# Patient Record
Sex: FEMALE | Race: WHITE | Employment: UNEMPLOYED | URBAN - METROPOLITAN AREA
[De-identification: names, ages, dates, MRNs, and addresses within clinical notes are randomized per-mention and may not be internally consistent; named-entity substitution may affect disease eponyms.]

---

## 2019-04-12 ENCOUNTER — APPOINTMENT (OUTPATIENT)
Dept: NON INVASIVE DIAGNOSTICS | Age: 44
End: 2019-04-12
Attending: PHYSICIAN ASSISTANT
Payer: COMMERCIAL

## 2019-04-12 ENCOUNTER — HOSPITAL ENCOUNTER (OUTPATIENT)
Age: 44
Setting detail: OBSERVATION
Discharge: HOME | End: 2019-04-13
Attending: INTERNAL MEDICINE | Admitting: INTERNAL MEDICINE
Payer: COMMERCIAL

## 2019-04-12 DIAGNOSIS — G47.09 OTHER INSOMNIA: Primary | ICD-10-CM

## 2019-04-12 PROBLEM — R00.0 TACHYCARDIA: Status: ACTIVE | Noted: 2019-04-12

## 2019-04-12 LAB
ANION GAP SERPL CALC-SCNC: 6 MMOL/L (ref 5–15)
BUN SERPL-MCNC: 12 MG/DL (ref 6–20)
BUN/CREAT SERPL: 13 (ref 12–20)
CALCIUM SERPL-MCNC: 9.8 MG/DL (ref 8.5–10.1)
CHLORIDE SERPL-SCNC: 105 MMOL/L (ref 97–108)
CO2 SERPL-SCNC: 28 MMOL/L (ref 21–32)
CREAT SERPL-MCNC: 0.93 MG/DL (ref 0.55–1.02)
ERYTHROCYTE [DISTWIDTH] IN BLOOD BY AUTOMATED COUNT: 12.4 % (ref 11.5–14.5)
GLUCOSE SERPL-MCNC: 107 MG/DL (ref 65–100)
HCT VFR BLD AUTO: 45.4 % (ref 35–47)
HGB BLD-MCNC: 15 G/DL (ref 11.5–16)
MCH RBC QN AUTO: 33.2 PG (ref 26–34)
MCHC RBC AUTO-ENTMCNC: 33 G/DL (ref 30–36.5)
MCV RBC AUTO: 100.4 FL (ref 80–99)
NRBC # BLD: 0 K/UL (ref 0–0.01)
NRBC BLD-RTO: 0 PER 100 WBC
PLATELET # BLD AUTO: 302 K/UL (ref 150–400)
PMV BLD AUTO: 11.4 FL (ref 8.9–12.9)
POTASSIUM SERPL-SCNC: 3.7 MMOL/L (ref 3.5–5.1)
RBC # BLD AUTO: 4.52 M/UL (ref 3.8–5.2)
SODIUM SERPL-SCNC: 139 MMOL/L (ref 136–145)
T4 FREE SERPL-MCNC: 1.2 NG/DL (ref 0.8–1.5)
TSH SERPL DL<=0.05 MIU/L-ACNC: 1.72 UIU/ML (ref 0.36–3.74)
WBC # BLD AUTO: 8.5 K/UL (ref 3.6–11)

## 2019-04-12 PROCEDURE — 99218 HC RM OBSERVATION: CPT

## 2019-04-12 PROCEDURE — 85027 COMPLETE CBC AUTOMATED: CPT

## 2019-04-12 PROCEDURE — 80048 BASIC METABOLIC PNL TOTAL CA: CPT

## 2019-04-12 PROCEDURE — 96372 THER/PROPH/DIAG INJ SC/IM: CPT

## 2019-04-12 PROCEDURE — 74011250636 HC RX REV CODE- 250/636: Performed by: INTERNAL MEDICINE

## 2019-04-12 PROCEDURE — 74011250637 HC RX REV CODE- 250/637: Performed by: INTERNAL MEDICINE

## 2019-04-12 PROCEDURE — 36415 COLL VENOUS BLD VENIPUNCTURE: CPT

## 2019-04-12 PROCEDURE — 84439 ASSAY OF FREE THYROXINE: CPT

## 2019-04-12 PROCEDURE — 93306 TTE W/DOPPLER COMPLETE: CPT

## 2019-04-12 PROCEDURE — 74011250637 HC RX REV CODE- 250/637: Performed by: PHYSICIAN ASSISTANT

## 2019-04-12 PROCEDURE — 84443 ASSAY THYROID STIM HORMONE: CPT

## 2019-04-12 RX ORDER — SODIUM CHLORIDE 0.9 % (FLUSH) 0.9 %
5-40 SYRINGE (ML) INJECTION EVERY 8 HOURS
Status: DISCONTINUED | OUTPATIENT
Start: 2019-04-12 | End: 2019-04-13 | Stop reason: HOSPADM

## 2019-04-12 RX ORDER — ZOLPIDEM TARTRATE 5 MG/1
5 TABLET ORAL
Status: DISCONTINUED | OUTPATIENT
Start: 2019-04-12 | End: 2019-04-13 | Stop reason: HOSPADM

## 2019-04-12 RX ORDER — ACETAMINOPHEN 325 MG/1
650 TABLET ORAL
Status: DISCONTINUED | OUTPATIENT
Start: 2019-04-12 | End: 2019-04-13 | Stop reason: HOSPADM

## 2019-04-12 RX ORDER — METOPROLOL TARTRATE 25 MG/1
25 TABLET, FILM COATED ORAL EVERY 12 HOURS
Status: DISCONTINUED | OUTPATIENT
Start: 2019-04-12 | End: 2019-04-13

## 2019-04-12 RX ORDER — SODIUM CHLORIDE 0.9 % (FLUSH) 0.9 %
5-40 SYRINGE (ML) INJECTION AS NEEDED
Status: DISCONTINUED | OUTPATIENT
Start: 2019-04-12 | End: 2019-04-13 | Stop reason: HOSPADM

## 2019-04-12 RX ORDER — ENOXAPARIN SODIUM 100 MG/ML
40 INJECTION SUBCUTANEOUS EVERY 24 HOURS
Status: DISCONTINUED | OUTPATIENT
Start: 2019-04-12 | End: 2019-04-13

## 2019-04-12 RX ORDER — METOPROLOL TARTRATE 5 MG/5ML
5 INJECTION INTRAVENOUS
Status: DISCONTINUED | OUTPATIENT
Start: 2019-04-12 | End: 2019-04-13 | Stop reason: HOSPADM

## 2019-04-12 RX ADMIN — ZOLPIDEM TARTRATE 5 MG: 5 TABLET ORAL at 21:48

## 2019-04-12 RX ADMIN — ENOXAPARIN SODIUM 40 MG: 100 INJECTION SUBCUTANEOUS at 17:17

## 2019-04-12 RX ADMIN — METOPROLOL TARTRATE 25 MG: 25 TABLET ORAL at 15:49

## 2019-04-12 RX ADMIN — Medication 10 ML: at 15:01

## 2019-04-12 RX ADMIN — Medication 10 ML: at 21:48

## 2019-04-12 NOTE — CONSULTS
Cardiology Consult Note      Patient Name: Mary Chung  : 1975 MRN: [id number]  Date: 2019  Time: 2:34 PM  Admit Diagnosis: Tachycardia [R00.0]  Primary Cardiologist: none   Consulting Cardiologist: Rolando Will. Glenis Martinez M.D.  Nidia Osbaldo for Consult: HTN, Tachycardia  Requesting MD: Tila Hammer MD  Assessment/Plan/Discussion:Cardiology Attending:     Patient seen on the day of progress note and examined  and agree with Advance Practice Provider (MANOJ, NP,PA)  assessment and plans. Mary Chung is a 38 y.o. female   Previously quite healthy active lady with gestational DM  Now with fatigue and a rapid sinus tachycardia and marked hypertension  No   No edema rales  Has no chest pain  TSH is 1.7   Prelim echo is WNL-will review later tonight  HR has come down in evening   Unclear source of tachycardia  Has noted numbness in hands  Does not appear dehydrated, no fever- no factors to explain tachycardia  Has been started no metoprolol  Will consider ACE    Patient Vitals for the past 12 hrs:   Temp Pulse Resp BP SpO2   19 1925 98.4 °F (36.9 °C) 83 16 166/84 100 %   19 1653    144/84    19 1549  (!) 105      19 1511 98 °F (36.7 °C) 85 16 144/84 100 %   19 1441  (!) 126      19 1434 98 °F (36.7 °C) (!) 113 16 164/84 100 %      Valentino Officer, MD          HPI:  Mary Chung is a 38 y.o. female admitted on 2019  for Tachycardia [R00.0]. has a past medical history of Gestational diabetes and Hypercholesteremia. Presented to Dr. Emilie Mejia office by referral from her OB/GYN for HTN. Patient notes she has never had higher BP before. She has been experiencing head pressure, numb hands, some visual changes, +/- fatigue and dry eyes as of late. She \"just feels off\" lately. She exercises 3x per week, eats fairly well and has no past medical history, save for gestational DM.   She does not smoke, consumes ETOH socially. FMhx for cancer, but no HTN, DM or CAD    Subjective:  Denies CP, SOB or palpitations at this time. Telemetry with sinus tach. Assessment and Plan     1. HTN   -    - Lopressor 25 mg BID PO   - Check echo  2. Sinus tach   - HR variable from low 100s to 125-130s   - Check echo    Direct admit from Dr. Mila Ruiz office. High BP and HR rate. Echo ordered. Dr. Osborne Monday ordered BMP, CBC and thyroid panel. Will follow up echo results. Patient Active Problem List   Diagnosis Code    Hypercholesteremia E78.00    Gestational diabetes O24.419    Tachycardia R00.0     No specialty comments available. Review of Systems:     GENERAL   Recent weight loss - no   Fever -----------------   no   Chills -----------------   no     EYES, VISION   Visual Changes - no     EARS, NOSE, THROAT   Hearing loss ----------- no   Swallowing difficulties - no     CARDIOVASCULAR   Chest pain/pressure ---- no   Arrhythmia/palpitations - no       RESPIRATORY   Cough ------------------ no   Shortness of breath - no   Wheezing -------------- no   GASTROINTESTINAL   Abdominal pain - no   Heartburn -------- no   Bloody stool ----- no     GENITOURINARY   Frequent urination - no   Urgency -------------- no     MUSCULOSKELETAL   Joint pain/swelling ---- no   Musculoskeletal pain - no     SKIN & INTEGUMENTARY   Rashes - no   Sores --- no         NEUROLOGICAL   Numbness/tingling - no   Sensation loss ------ no     PSYCHIATRIC   Nervousness/anxiety - no   Depression -------------- no     ENDOCRINE   Heat/cold intolerance - no   Excessive thirst -------- no     HEMATOLOGIC/LYMPHATIC   Abnormal bleeding - no     ALL/IMMUN   Allergic reaction ------ no   Recurrent infections - no     Previous treatment/evaluation includes none . Cardiac risk factors: none.     Past Medical History:   Diagnosis Date    Gestational diabetes     Hypercholesteremia      Past Surgical History:   Procedure Laterality Date    HX  SECTION      x 3     Current Facility-Administered Medications   Medication Dose Route Frequency    sodium chloride (NS) flush 5-40 mL  5-40 mL IntraVENous Q8H    sodium chloride (NS) flush 5-40 mL  5-40 mL IntraVENous PRN    zolpidem (AMBIEN) tablet 5 mg  5 mg Oral QHS PRN    acetaminophen (TYLENOL) tablet 650 mg  650 mg Oral Q4H PRN    enoxaparin (LOVENOX) injection 40 mg  40 mg SubCUTAneous Q24H    metoprolol (LOPRESSOR) injection 5 mg  5 mg IntraVENous Q6H PRN       No Known Allergies   Family History   Problem Relation Age of Onset    Breast Cancer Mother 77    Cancer Paternal Grandmother     Cancer Paternal Grandfather     Cancer Maternal Uncle         pancreatic    Breast Cancer Maternal Grandmother 72      Social History     Socioeconomic History    Marital status:      Spouse name: Not on file    Number of children: Not on file    Years of education: Not on file    Highest education level: Not on file   Occupational History    Occupation: stay at home mom   Tobacco Use    Smoking status: Never Smoker    Smokeless tobacco: Never Used   Substance and Sexual Activity    Alcohol use: Yes     Alcohol/week: 1.5 oz     Types: 3 drink(s) per week     Comment: maybe 3 drinks       Objective:    Physical Exam    Vitals: There were no vitals filed for this visit. General:    Alert, cooperative, no distress, appears stated age. Neck:   Supple, no carotid bruit and no JVD. Back:     Symmetric, normal curvature. Lungs:     clear to auscultation bilaterally. Heart[de-identified]    Regular rate and rhythm, S1, S2 normal, no murmur, click, rub or gallop. Abdomen:     Soft, non-tender. Bowel sounds normal.    Extremities:   Extremities normal, atraumatic, no cyanosis or edema. Vascular:   Pulses - 2+ radials   Skin:   Skin color normal. No rashes or lesions   Neurologic:   CN II-XII grossly intact.         Telemetry: normal sinus rhythm, tachy    ECG:   EKG Results     None            Data Review:     Radiology:   XR Results (most recent):  No results found for this or any previous visit. No results for input(s): CPK, TROIQ in the last 72 hours. No lab exists for component: CKQMB, CPKMB, BMPP  No results for input(s): NA, K, CL, CO2, BUN, CREA, GLU, PHOS, CA in the last 72 hours. No results for input(s): WBC, HGB, HCT, PLT, HGBEXT, HCTEXT, PLTEXT in the last 72 hours. No results for input(s): PTP, INR, GPT, SGOT, AP in the last 72 hours. No lab exists for component: PTTP, INREXT  No results for input(s): CHOL, LDLC in the last 72 hours. No lab exists for component: TGL, HDLC,  HBA1C  No results for input(s): CRP, TSH, TSHEXT in the last 72 hours. No lab exists for component: ESR    Alesia Dach. Andrew Corbett M.D.          [facility] [address] [place] [address] [address] [facility], [address] [address]     [facility]     [phone]    CC:Solo Watkins MD

## 2019-04-12 NOTE — PROGRESS NOTES
Problem: Hypertension Goal: *Blood pressure within specified parameters Outcome: Progressing Towards Goal 
 Cardiology consulted, [place] ROBERTS at bedside, orders received. Will continue to monitor and educate. Bedside shift change report given to Mike Mei (oncoming nurse) by Abdiaziz Dowd (offgoing nurse). Report included the following information SBAR, Kardex, ED Summary, Procedure Summary, Intake/Output, MAR, Recent Results, Med Rec Status, Cardiac Rhythm NSR, Alarm Parameters  and Quality Measures.

## 2019-04-12 NOTE — H&P
History and Physical 
 
Subjective:  
 
Nolvia Orosco is a 38 y.o.  female who presents with tachycardia and hypertensive urgency. Patient has been feeling more anxious since the Fall. Recently felt \"off\"/  Saw OB/gyn last week who told her she had elevated bp, to stop the bcp and monitor bp. She presents to office today- bp 180/100 and pulse up to 160. She denies chest pain, sob, nausea, vomiting, diarrhea, weight loss, syncope, fever, palpitations, headache, visual changes. .  
Past Medical History:  
Diagnosis Date  Gestational diabetes  Hypercholesteremia No Known Allergies Prior to Admission medications Not on File Social History Tobacco Use  Smoking status: Never Smoker  Smokeless tobacco: Never Used Substance Use Topics  Alcohol use: Yes Alcohol/week: 1.5 oz Types: 3 drink(s) per week Comment: maybe 3 drinks Family History Problem Relation Age of Onset  Breast Cancer Mother 77  Cancer Paternal Grandmother  Cancer Paternal Grandfather  Cancer Maternal Uncle   
     pancreatic  Breast Cancer Maternal Grandmother 72 Review of Systems: As above. Objective:  
 
 
Physical Exam: slim 37 yo wf In NAD. HEENT -- Pupils round. O/P Clear. Neck -- Supple. No JVD. no thyromegaly Heart -- tachy at 160 Lungs -- CTA. Abdomen -- Soft. Non-tender. Non-distended. No masses. Bowel sounds present. Extremities -- No edema. Data Review:  
Recent Results (from the past 24 hour(s)) AMB POC URINALYSIS DIP STICK MANUAL W/O MICRO Collection Time: 04/12/19  1:11 PM  
Result Value Ref Range Color (UA POC) Yellow Clarity (UA POC) Clear Glucose (UA POC) Negative Negative Bilirubin (UA POC) Negative Negative Ketones (UA POC) Negative Negative Specific gravity (UA POC) 1.010 1.001 - 1.035 Blood (UA POC) Negative Negative pH (UA POC) 7.0 4.6 - 8.0 Protein (UA POC) Negative Negative Urobilinogen (UA POC) 0.2 mg/dL 0.2 - 1 Nitrites (UA POC) Negative Negative Leukocyte esterase (UA POC) Negative Negative AMB POC COMPLETE CBC,AUTOMATED ENTER Collection Time: 04/12/19  1:11 PM  
Result Value Ref Range WBC (POC) 9.0 4.5 - 10.5 K/uL LYMPHOCYTES (POC) 35.7 20.5 - 51.1 % MONOCYTES (POC) 4.4 1.7 - 9.3 % GRANULOCYTES (POC) 59.9 42.2 - 75.2 % ABS. LYMPHS (POC) 3.2 1.2 - 3.4 K/uL  
 ABS. MONOS (POC) 0.4 0.1 - 0.6 10^3/ul  
 ABS. GRANS (POC) 5.4 1.4 - 6.5 10^3/ul RBC (POC) 4.84 4.00 - 6.00 M/uL  
 HGB (POC) 15.9 11 - 18 g/dL HCT (POC) 47.4 35.0 - 60.0 % MCV (POC) 98.0 80.0 - 99.9 fL  
 MCH (POC) 32.9 (A) 27.0 - 31.0 pg  
 MCHC (POC) 33.6 33.0 - 37.0 g/dL  
 RDW (POC) 12.9 11.6 - 13.7 % PLATELET (POC) 340 783 - 450 K/uL  
 MPV (POC) 8.6 7.8 - 11 fL Chest x-ray EKG Assessment:  
 
Active Problems: 
  Tachycardia (4/12/2019) Plan:  
Admit for observation due to the weekend and elevated heart rate. Monitor to rule out a flutter, iv lopressor, cards consult, echo Check labs to rule out secondary causes. Signed By: Isabella Suarez MD   
 April 12, 2019

## 2019-04-13 VITALS
DIASTOLIC BLOOD PRESSURE: 92 MMHG | OXYGEN SATURATION: 100 % | RESPIRATION RATE: 16 BRPM | TEMPERATURE: 98.6 F | WEIGHT: 116.4 LBS | HEIGHT: 62 IN | SYSTOLIC BLOOD PRESSURE: 145 MMHG | BODY MASS INDEX: 21.42 KG/M2 | HEART RATE: 83 BPM

## 2019-04-13 LAB
ALBUMIN SERPL-MCNC: 3.7 G/DL (ref 3.5–5)
ALBUMIN/GLOB SERPL: 1 {RATIO} (ref 1.1–2.2)
ALP SERPL-CCNC: 66 U/L (ref 45–117)
ALT SERPL-CCNC: 22 U/L (ref 12–78)
ANION GAP SERPL CALC-SCNC: 6 MMOL/L (ref 5–15)
AST SERPL-CCNC: 15 U/L (ref 15–37)
BILIRUB SERPL-MCNC: 0.5 MG/DL (ref 0.2–1)
BUN SERPL-MCNC: 11 MG/DL (ref 6–20)
BUN/CREAT SERPL: 13 (ref 12–20)
CALCIUM SERPL-MCNC: 9.5 MG/DL (ref 8.5–10.1)
CHLORIDE SERPL-SCNC: 106 MMOL/L (ref 97–108)
CO2 SERPL-SCNC: 28 MMOL/L (ref 21–32)
CREAT SERPL-MCNC: 0.87 MG/DL (ref 0.55–1.02)
ECHO AO ROOT DIAM: 2.77 CM
ECHO AV PEAK GRADIENT: 10.3 MMHG
ECHO AV PEAK VELOCITY: 160.33 CM/S
ECHO LA AREA 4C: 12.9 CM2
ECHO LA MAJOR AXIS: 2.59 CM
ECHO LA TO AORTIC ROOT RATIO: 0.93
ECHO LA VOL 2C: 61.33 ML (ref 22–52)
ECHO LA VOL 4C: 27.21 ML (ref 22–52)
ECHO LA VOL BP: 47.39 ML (ref 22–52)
ECHO LA VOL/BSA BIPLANE: 31.25 ML/M2 (ref 16–28)
ECHO LA VOLUME INDEX A2C: 40.44 ML/M2 (ref 16–28)
ECHO LA VOLUME INDEX A4C: 17.94 ML/M2 (ref 16–28)
ECHO LV INTERNAL DIMENSION DIASTOLIC: 4.43 CM (ref 3.9–5.3)
ECHO LV INTERNAL DIMENSION SYSTOLIC: 2.89 CM
ECHO LV IVSD: 0.71 CM (ref 0.6–0.9)
ECHO LV MASS 2D: 91.7 G (ref 67–162)
ECHO LV MASS INDEX 2D: 60.5 G/M2 (ref 43–95)
ECHO LV POSTERIOR WALL DIASTOLIC: 0.59 CM (ref 0.6–0.9)
ECHO MV A VELOCITY: 53.66 CM/S
ECHO MV AREA PHT: 3.8 CM2
ECHO MV E DECELERATION TIME (DT): 198 MS
ECHO MV E VELOCITY: 91.56 CM/S
ECHO MV E/A RATIO: 1.71
ECHO MV PRESSURE HALF TIME (PHT): 57.4 MS
ECHO RV INTERNAL DIMENSION: 2.82 CM
ECHO TV REGURGITANT MAX VELOCITY: 188.05 CM/S
ECHO TV REGURGITANT PEAK GRADIENT: 14.1 MMHG
GLOBULIN SER CALC-MCNC: 3.8 G/DL (ref 2–4)
GLUCOSE SERPL-MCNC: 97 MG/DL (ref 65–100)
POTASSIUM SERPL-SCNC: 3.6 MMOL/L (ref 3.5–5.1)
PROT SERPL-MCNC: 7.5 G/DL (ref 6.4–8.2)
SODIUM SERPL-SCNC: 140 MMOL/L (ref 136–145)

## 2019-04-13 PROCEDURE — 74011250637 HC RX REV CODE- 250/637: Performed by: INTERNAL MEDICINE

## 2019-04-13 PROCEDURE — 80053 COMPREHEN METABOLIC PANEL: CPT

## 2019-04-13 PROCEDURE — 99218 HC RM OBSERVATION: CPT

## 2019-04-13 PROCEDURE — 82384 ASSAY THREE CATECHOLAMINES: CPT

## 2019-04-13 PROCEDURE — 36415 COLL VENOUS BLD VENIPUNCTURE: CPT

## 2019-04-13 RX ORDER — METOPROLOL SUCCINATE 25 MG/1
25 TABLET, EXTENDED RELEASE ORAL DAILY
Status: DISCONTINUED | OUTPATIENT
Start: 2019-04-13 | End: 2019-04-13 | Stop reason: HOSPADM

## 2019-04-13 RX ORDER — ZOLPIDEM TARTRATE 5 MG/1
5 TABLET ORAL
Qty: 10 TAB | Refills: 0 | Status: SHIPPED | OUTPATIENT
Start: 2019-04-13 | End: 2019-07-02

## 2019-04-13 RX ORDER — METOPROLOL SUCCINATE 25 MG/1
25 TABLET, EXTENDED RELEASE ORAL DAILY
Qty: 30 TAB | Refills: 0 | Status: SHIPPED | OUTPATIENT
Start: 2019-04-14 | End: 2019-04-17 | Stop reason: DRUGHIGH

## 2019-04-13 RX ORDER — ACETAMINOPHEN 325 MG/1
650 TABLET ORAL
Qty: 25 TAB | Refills: 0 | Status: SHIPPED
Start: 2019-04-13

## 2019-04-13 RX ADMIN — METOPROLOL SUCCINATE 25 MG: 25 TABLET, EXTENDED RELEASE ORAL at 08:22

## 2019-04-13 RX ADMIN — Medication 10 ML: at 06:05

## 2019-04-13 NOTE — PROGRESS NOTES
Medical Progress Note NAME: Sarika Uriarte :  1975 MRM:  [id number] Date/Time: 2019 Problem List:  
Active Problems: 
  Tachycardia (2019) Subjective:  
 
Feels well w/o anxiety , palpitations, flushing , diarrhea. bp and pulse have come down after 1 dose Lopressor yesterday Past Medical History:  
Diagnosis Date  Gestational diabetes  Hypercholesteremia Objective:  
 
 
 
Vitals:  
  
Last 24hrs VS reviewed since prior progress note. Most recent are: monitor : NSR Visit Vitals /81 (BP 1 Location: Right arm, BP Patient Position: At rest) Pulse 80 Temp 98.3 °F (36.8 °C) Resp 17 Ht 5' 2\" (1.575 m) Wt 116 lb 6.5 oz (52.8 kg) SpO2 100% BMI 21.29 kg/m² SpO2 Readings from Last 6 Encounters:  
19 100% Intake/Output Summary (Last 24 hours) at 2019 0744 Last data filed at 2019 2232 Gross per 24 hour Intake 480 ml Output  Net 480 ml Exam:  
   General:  Alert, cooperative, no distress, appears stated age. Lungs:   Clear to auscultation bilaterally. Heart:  Regular rate and rhythm, S1, S2 normal, no murmur, click, rub or gallop. Abdomen:   Soft, non-tender. Bowel sounds normal. No masses,  No organomegaly. Extremities: No pedal edema. Lab Data Reviewed: (see below) Recent Results (from the past 24 hour(s)) AMB POC URINALYSIS DIP STICK MANUAL W/O MICRO Collection Time: 19  1:11 PM  
Result Value Ref Range Color (UA POC) Yellow Clarity (UA POC) Clear Glucose (UA POC) Negative Negative Bilirubin (UA POC) Negative Negative Ketones (UA POC) Negative Negative Specific gravity (UA POC) 1.010 1.001 - 1.035 Blood (UA POC) Negative Negative pH (UA POC) 7.0 4.6 - 8.0 Protein (UA POC) Negative Negative Urobilinogen (UA POC) 0.2 mg/dL 0.2 - 1 Nitrites (UA POC) Negative Negative Leukocyte esterase (UA POC) Negative Negative AMB POC COMPLETE CBC,AUTOMATED ENTER Collection Time: 04/12/19  1:11 PM  
Result Value Ref Range WBC (POC) 9.0 4.5 - 10.5 K/uL LYMPHOCYTES (POC) 35.7 20.5 - 51.1 % MONOCYTES (POC) 4.4 1.7 - 9.3 % GRANULOCYTES (POC) 59.9 42.2 - 75.2 % ABS. LYMPHS (POC) 3.2 1.2 - 3.4 K/uL  
 ABS. MONOS (POC) 0.4 0.1 - 0.6 10^3/ul  
 ABS. GRANS (POC) 5.4 1.4 - 6.5 10^3/ul RBC (POC) 4.84 4.00 - 6.00 M/uL  
 HGB (POC) 15.9 11 - 18 g/dL HCT (POC) 47.4 35.0 - 60.0 % MCV (POC) 98.0 80.0 - 99.9 fL  
 MCH (POC) 32.9 (A) 27.0 - 31.0 pg  
 MCHC (POC) 33.6 33.0 - 37.0 g/dL  
 RDW (POC) 12.9 11.6 - 13.7 % PLATELET (POC) 984 447 - 450 K/uL  
 MPV (POC) 8.6 7.8 - 11 fL  
CBC W/O DIFF Collection Time: 04/12/19  3:04 PM  
Result Value Ref Range WBC 8.5 3.6 - 11.0 K/uL  
 RBC 4.52 3.80 - 5.20 M/uL  
 HGB 15.0 11.5 - 16.0 g/dL HCT 45.4 35.0 - 47.0 % .4 (H) 80.0 - 99.0 FL  
 MCH 33.2 26.0 - 34.0 PG  
 MCHC 33.0 30.0 - 36.5 g/dL  
 RDW 12.4 11.5 - 14.5 % PLATELET 856 534 - 714 K/uL MPV 11.4 8.9 - 12.9 FL  
 NRBC 0.0 0  WBC ABSOLUTE NRBC 0.00 0.00 - 0.01 K/uL  
TSH 3RD GENERATION Collection Time: 04/12/19  3:04 PM  
Result Value Ref Range TSH 1.72 0.36 - 3.74 uIU/mL T4, FREE Collection Time: 04/12/19  3:04 PM  
Result Value Ref Range T4, Free 1.2 0.8 - 1.5 NG/DL  
METABOLIC PANEL, BASIC Collection Time: 04/12/19  3:04 PM  
Result Value Ref Range Sodium 139 136 - 145 mmol/L Potassium 3.7 3.5 - 5.1 mmol/L Chloride 105 97 - 108 mmol/L  
 CO2 28 21 - 32 mmol/L Anion gap 6 5 - 15 mmol/L Glucose 107 (H) 65 - 100 mg/dL BUN 12 6 - 20 MG/DL Creatinine 0.93 0.55 - 1.02 MG/DL  
 BUN/Creatinine ratio 13 12 - 20 GFR est AA >60 >60 ml/min/1.73m2 GFR est non-AA >60 >60 ml/min/1.73m2 Calcium 9.8 8.5 - 10.1 MG/DL  
ECHO ADULT COMPLETE Collection Time: 04/12/19  5:14 PM  
Result Value Ref Range LA Volume 47.39 22 - 52 mL Ao Root D 2.77 cm Aortic Valve Systolic Peak Velocity 295.17 cm/s AoV PG 10.3 mmHg LVIDd 4.43 3.9 - 5.3 cm  
 LVPWd 0.59 (A) 0.6 - 0.9 cm LVIDs 2.89 cm IVSd 0.71 0.6 - 0.9 cm  
 MVA (PHT) 3.8 cm2  
 MV A Aramis 53.66 cm/s  
 MV E Aramis 91.56 cm/s  
 MV E/A 1.70 Left Atrium to Aortic Root Ratio 0.93   
 RVIDd 2.82 cm  
 LA Vol 4C 27.21 22 - 52 mL  
 LA Vol 2C 61.33 (A) 22 - 52 mL  
 LA Area 4C 12.9 cm2 LV Mass AL 91.7 67 - 162 g  
 LV Mass AL Index 60.5 43 - 95 g/m2 Mitral Valve E Wave Deceleration Time 198.0 ms  
 Mitral Valve Pressure Half-time 57.4 ms Left Atrium Major Axis 2.59 cm Triscuspid Valve Regurgitation Peak Gradient 14.1 mmHg  
 TR Max Velocity 188.05 cm/s  
 LA Vol Index 31.25 16 - 28 ml/m2 LA Vol Index 40.44 16 - 28 ml/m2 LA Vol Index 17.94 16 - 28 ml/m2 METABOLIC PANEL, COMPREHENSIVE Collection Time: 04/13/19  3:23 AM  
Result Value Ref Range Sodium 140 136 - 145 mmol/L Potassium 3.6 3.5 - 5.1 mmol/L Chloride 106 97 - 108 mmol/L  
 CO2 28 21 - 32 mmol/L Anion gap 6 5 - 15 mmol/L Glucose 97 65 - 100 mg/dL BUN 11 6 - 20 MG/DL Creatinine 0.87 0.55 - 1.02 MG/DL  
 BUN/Creatinine ratio 13 12 - 20 GFR est AA >60 >60 ml/min/1.73m2 GFR est non-AA >60 >60 ml/min/1.73m2 Calcium 9.5 8.5 - 10.1 MG/DL Bilirubin, total 0.5 0.2 - 1.0 MG/DL  
 ALT (SGPT) 22 12 - 78 U/L  
 AST (SGOT) 15 15 - 37 U/L Alk. phosphatase 66 45 - 117 U/L Protein, total 7.5 6.4 - 8.2 g/dL Albumin 3.7 3.5 - 5.0 g/dL Globulin 3.8 2.0 - 4.0 g/dL A-G Ratio 1.0 (L) 1.1 - 2.2 Medications Reviewed: (see below) 
 
______________________________________________________________________ Medications:  
 
Current Facility-Administered Medications Medication Dose Route Frequency  metoprolol succinate (TOPROL-XL) XL tablet 25 mg  25 mg Oral DAILY  sodium chloride (NS) flush 5-40 mL  5-40 mL IntraVENous Q8H  
 sodium chloride (NS) flush 5-40 mL  5-40 mL IntraVENous PRN  
  zolpidem (AMBIEN) tablet 5 mg  5 mg Oral QHS PRN  
 acetaminophen (TYLENOL) tablet 650 mg  650 mg Oral Q4H PRN  
 enoxaparin (LOVENOX) injection 40 mg  40 mg SubCUTAneous Q24H  
 metoprolol (LOPRESSOR) injection 5 mg  5 mg IntraVENous Q6H PRN Assessment:  
Tachycardia , possible familial HTN, responding to BB Change Lopressor to Toprol. Check serum Catecholamines for completeness. Cards f/u Patient Active Problem List  
Diagnosis Code  Hypercholesteremia E78.00  Gestational diabetes O24.419  Tachycardia R00.0 Plan: Hopefully home later today  
      
 
  
 
 
  
              
 
 
 
 
 
 
      
___________________________________________________ Attending Physician: John Mckeon MD

## 2019-04-13 NOTE — PROGRESS NOTES
Cardiology Progress Note 4/13/2019 12:18 PM 
 
Admit Date: 4/12/2019 Admit Diagnosis: Tachycardia [R00.0] Assessment:  
 
Active Problems: 
  Tachycardia (4/12/2019) Plan: Hypertension NOS  improved   Continue current therapy, OK to release. Echo normal, no LVH. Sanya Velez MD 
[phone]  Cell Subjective:  
 
Jose Mejia denies palpitations, irregular heart beats. She reports symptoms are improved since yesterday. ROS: negative except as noted above. Objective:  
 
 
Visit Vitals BP (!) 145/92 (BP 1 Location: Right arm, BP Patient Position: At rest) Pulse 83 Temp 98.6 °F (37 °C) Resp 16 Ht 5' 2\" (1.575 m) Wt 52.8 kg (116 lb 6.5 oz) SpO2 100% BMI 21.29 kg/m² Current Facility-Administered Medications Medication Dose Route Frequency  metoprolol succinate (TOPROL-XL) XL tablet 25 mg  25 mg Oral DAILY  sodium chloride (NS) flush 5-40 mL  5-40 mL IntraVENous Q8H  
 sodium chloride (NS) flush 5-40 mL  5-40 mL IntraVENous PRN  
 zolpidem (AMBIEN) tablet 5 mg  5 mg Oral QHS PRN  
 acetaminophen (TYLENOL) tablet 650 mg  650 mg Oral Q4H PRN  
 metoprolol (LOPRESSOR) injection 5 mg  5 mg IntraVENous Q6H PRN Physical Exam: 
Visit Vitals BP (!) 145/92 (BP 1 Location: Right arm, BP Patient Position: At rest) Pulse 83 Temp 98.6 °F (37 °C) Resp 16 Ht 5' 2\" (1.575 m) Wt 52.8 kg (116 lb 6.5 oz) SpO2 100% BMI 21.29 kg/m² General Appearance:  Well developed, well nourished,alert and oriented x 3,  individual in no acute distress. Ears/Nose/Mouth/Throat:   Hearing grossly normal. 
  
    Neck: Supple. Chest:   Lungs clear to auscultation bilaterally. Cardiovascular:  Regular rate and rhythm, S1, S2 normal, no murmur. Abdomen:   Soft, non-tender, bowel sounds are active. Extremities: No edema bilaterally. Skin: Warm and dry. Telemetry: normal sinus rhythm Data Review:  
 
Labs: Recent Results (from the past 24 hour(s)) AMB POC URINALYSIS DIP STICK MANUAL W/O MICRO Collection Time: 04/12/19  1:11 PM  
Result Value Ref Range Color (UA POC) Yellow Clarity (UA POC) Clear Glucose (UA POC) Negative Negative Bilirubin (UA POC) Negative Negative Ketones (UA POC) Negative Negative Specific gravity (UA POC) 1.010 1.001 - 1.035 Blood (UA POC) Negative Negative pH (UA POC) 7.0 4.6 - 8.0 Protein (UA POC) Negative Negative Urobilinogen (UA POC) 0.2 mg/dL 0.2 - 1 Nitrites (UA POC) Negative Negative Leukocyte esterase (UA POC) Negative Negative AMB POC COMPLETE CBC,AUTOMATED ENTER Collection Time: 04/12/19  1:11 PM  
Result Value Ref Range WBC (POC) 9.0 4.5 - 10.5 K/uL LYMPHOCYTES (POC) 35.7 20.5 - 51.1 % MONOCYTES (POC) 4.4 1.7 - 9.3 % GRANULOCYTES (POC) 59.9 42.2 - 75.2 % ABS. LYMPHS (POC) 3.2 1.2 - 3.4 K/uL  
 ABS. MONOS (POC) 0.4 0.1 - 0.6 10^3/ul  
 ABS. GRANS (POC) 5.4 1.4 - 6.5 10^3/ul RBC (POC) 4.84 4.00 - 6.00 M/uL  
 HGB (POC) 15.9 11 - 18 g/dL HCT (POC) 47.4 35.0 - 60.0 % MCV (POC) 98.0 80.0 - 99.9 fL  
 MCH (POC) 32.9 (A) 27.0 - 31.0 pg  
 MCHC (POC) 33.6 33.0 - 37.0 g/dL  
 RDW (POC) 12.9 11.6 - 13.7 % PLATELET (POC) 356 514 - 450 K/uL  
 MPV (POC) 8.6 7.8 - 11 fL  
CBC W/O DIFF Collection Time: 04/12/19  3:04 PM  
Result Value Ref Range WBC 8.5 3.6 - 11.0 K/uL  
 RBC 4.52 3.80 - 5.20 M/uL  
 HGB 15.0 11.5 - 16.0 g/dL HCT 45.4 35.0 - 47.0 % .4 (H) 80.0 - 99.0 FL  
 MCH 33.2 26.0 - 34.0 PG  
 MCHC 33.0 30.0 - 36.5 g/dL  
 RDW 12.4 11.5 - 14.5 % PLATELET 270 199 - 377 K/uL MPV 11.4 8.9 - 12.9 FL  
 NRBC 0.0 0  WBC ABSOLUTE NRBC 0.00 0.00 - 0.01 K/uL  
TSH 3RD GENERATION Collection Time: 04/12/19  3:04 PM  
Result Value Ref Range TSH 1.72 0.36 - 3.74 uIU/mL T4, FREE Collection Time: 04/12/19  3:04 PM  
Result Value Ref Range  T4, Free 1.2 0.8 - 1.5 NG/DL  
 METABOLIC PANEL, BASIC Collection Time: 04/12/19  3:04 PM  
Result Value Ref Range Sodium 139 136 - 145 mmol/L Potassium 3.7 3.5 - 5.1 mmol/L Chloride 105 97 - 108 mmol/L  
 CO2 28 21 - 32 mmol/L Anion gap 6 5 - 15 mmol/L Glucose 107 (H) 65 - 100 mg/dL BUN 12 6 - 20 MG/DL Creatinine 0.93 0.55 - 1.02 MG/DL  
 BUN/Creatinine ratio 13 12 - 20 GFR est AA >60 >60 ml/min/1.73m2 GFR est non-AA >60 >60 ml/min/1.73m2 Calcium 9.8 8.5 - 10.1 MG/DL  
ECHO ADULT COMPLETE Collection Time: 04/12/19  5:14 PM  
Result Value Ref Range LA Volume 47.39 22 - 52 mL Ao Root D 2.77 cm Aortic Valve Systolic Peak Velocity 536.48 cm/s AoV PG 10.3 mmHg LVIDd 4.43 3.9 - 5.3 cm  
 LVPWd 0.59 (A) 0.6 - 0.9 cm LVIDs 2.89 cm IVSd 0.71 0.6 - 0.9 cm  
 MVA (PHT) 3.8 cm2  
 MV A Aramis 53.66 cm/s  
 MV E Aramis 91.56 cm/s  
 MV E/A 1.71 Left Atrium to Aortic Root Ratio 0.93   
 RVIDd 2.82 cm  
 LA Vol 4C 27.21 22 - 52 mL  
 LA Vol 2C 61.33 (A) 22 - 52 mL  
 LA Area 4C 12.9 cm2 LV Mass AL 91.7 67 - 162 g  
 LV Mass AL Index 60.5 43 - 95 g/m2 Mitral Valve E Wave Deceleration Time 198.0 ms  
 Mitral Valve Pressure Half-time 57.4 ms Left Atrium Major Axis 2.59 cm Triscuspid Valve Regurgitation Peak Gradient 14.1 mmHg  
 TR Max Velocity 188.05 cm/s  
 LA Vol Index 31.25 16 - 28 ml/m2 LA Vol Index 40.44 16 - 28 ml/m2 LA Vol Index 17.94 16 - 28 ml/m2 METABOLIC PANEL, COMPREHENSIVE Collection Time: 04/13/19  3:23 AM  
Result Value Ref Range Sodium 140 136 - 145 mmol/L Potassium 3.6 3.5 - 5.1 mmol/L Chloride 106 97 - 108 mmol/L  
 CO2 28 21 - 32 mmol/L Anion gap 6 5 - 15 mmol/L Glucose 97 65 - 100 mg/dL BUN 11 6 - 20 MG/DL Creatinine 0.87 0.55 - 1.02 MG/DL  
 BUN/Creatinine ratio 13 12 - 20 GFR est AA >60 >60 ml/min/1.73m2 GFR est non-AA >60 >60 ml/min/1.73m2 Calcium 9.5 8.5 - 10.1 MG/DL  Bilirubin, total 0.5 0.2 - 1.0 MG/DL  
 ALT (SGPT) 22 12 - 78 U/L  
 AST (SGOT) 15 15 - 37 U/L Alk. phosphatase 66 45 - 117 U/L Protein, total 7.5 6.4 - 8.2 g/dL Albumin 3.7 3.5 - 5.0 g/dL Globulin 3.8 2.0 - 4.0 g/dL A-G Ratio 1.0 (L) 1.1 - 2.2 Pat Millan MD

## 2019-04-13 NOTE — DISCHARGE INSTRUCTIONS
Patient Discharge Instructions    Dorie Powers / [id number] : 1975    Admitted 2019 Discharged: 2019     Take Home Medications          · It is important that you take the medication exactly as they are prescribed. · Keep your medication in the bottles provided by the pharmacist and keep a list of the medication names, dosages, and times to be taken in your wallet. · Do not take other medications without consulting your doctor. What to do at Home    Recommended diet: low salt. No alcohol until you see Dr. Ziggy Vines. No Advil(Ibuprofen), Aleve, or Pseudofed. Tylenol as needed is fine. Recommended activity: no heavy exercise. You may go for walks but no running for exercise until you see Dr. Luis Wilson. If you experience any of the following symptoms: dizziness, heart racing, headache, or flushing, please follow up with Dr. Luis Wilson at [phone]    Follow-up with Dr. Luis Wilson the week of April 15. On 4/15, call her for an appointment        Information obtained by :  I understand that if any problems occur once I am at home I am to contact my physician. I understand and acknowledge receipt of the instructions indicated above.                                                                                                                                            Physician's or R.N.'s Signature                                                                  Date/Time                                                                                                                                              Patient or Representative Signature                                                          Date/Time

## 2019-04-13 NOTE — PROGRESS NOTES
Pt seen in f/u . bp improved. Discussed her situation with Dr. Sheri Burdick. She appears stable for d/c  
D/c instructions reviewed.

## 2019-04-13 NOTE — PROGRESS NOTES
Problem: Falls - Risk of 
Goal: *Absence of Falls Description Document Nannette Prasad Fall Risk and appropriate interventions in the flowsheet. 4/13/2019 0138 by Emil Seip, RN Outcome: Progressing Towards Goal 
4/12/2019 2335 by Emil Seip, RN Outcome: Progressing Towards Goal 
Up ad dago with steady gait. Started on beta blocker and also taking Hypnotics at night. Instructed to sit up slowly and to call for dizziness. Stated understanding. Problem: Hypertension Goal: *Blood pressure within specified parameters 4/13/2019 0138 by Emil Seip, RN Outcome: Progressing Towards Goal 
4/12/2019 2335 by Emil Seip, RN Outcome: Progressing Towards Goal 
    BP continues elevated. Stated anxiety r/t hospitalization and New diagnosis of hypertension. Beta blocker started. 9900-  Bedside shift change report given to JEANNE Xie (oncoming nurse) by JEANNE Saucedo (offgoing nurse). Report included the following information SBAR, Kardex, Intake/Output, Recent Results and Cardiac Rhythm NSR.

## 2019-04-13 NOTE — PROGRESS NOTES
Problem: Hypertension Goal: *Blood pressure within specified parameters Outcome: Progressing Towards Goal 
 Pt switched to Toprolol XL this am, plans to discharge this afternoon once to is cleared by cardiology. Pt has handout on metoprolol including side effects, pt verbalized understanding. Will continue to monitor and educate. I have reviewed discharge instructions with the patient. The patient verbalized understanding. I went over all prescriptions and follow up appointments. Pt was given opportunity for clarification and questions. Pt electronically signed copy of discharge summary.

## 2019-04-17 LAB
DOPAMINE SERPL-MCNC: 47 PG/ML (ref 0–48)
EPINEPH PLAS-MCNC: 75 PG/ML (ref 0–62)
NOREPINEPH PLAS-MCNC: 392 PG/ML (ref 0–874)

## 2019-05-13 ENCOUNTER — HOSPITAL ENCOUNTER (OUTPATIENT)
Dept: ULTRASOUND IMAGING | Age: 44
Discharge: HOME | End: 2019-05-13
Attending: INTERNAL MEDICINE
Payer: COMMERCIAL

## 2019-05-13 DIAGNOSIS — I10 HYPERTENSION, UNSPECIFIED TYPE: ICD-10-CM

## 2019-05-13 PROCEDURE — 76770 US EXAM ABDO BACK WALL COMP: CPT

## 2019-05-17 NOTE — PROGRESS NOTES
Called patient and Dr. Shahid December to discuss dispariety in renal size.   Will set up duplex and neprology appt

## 2019-06-07 ENCOUNTER — HOSPITAL ENCOUNTER (OUTPATIENT)
Dept: CT IMAGING | Age: 44
Discharge: HOME | End: 2019-06-07
Attending: INTERNAL MEDICINE
Payer: COMMERCIAL

## 2019-06-07 DIAGNOSIS — N27.9 SMALL KIDNEY, UNSPECIFIED: ICD-10-CM

## 2019-06-07 DIAGNOSIS — I70.1 ATHEROSCLEROSIS OF RENAL ARTERY (HCC): ICD-10-CM

## 2019-06-07 PROCEDURE — 74011000258 HC RX REV CODE- 258: Performed by: RADIOLOGY

## 2019-06-07 PROCEDURE — 74177 CT ABD & PELVIS W/CONTRAST: CPT

## 2019-06-07 PROCEDURE — 74011636320 HC RX REV CODE- 636/320: Performed by: RADIOLOGY

## 2019-06-07 RX ORDER — SODIUM CHLORIDE 0.9 % (FLUSH) 0.9 %
10 SYRINGE (ML) INJECTION
Status: COMPLETED | OUTPATIENT
Start: 2019-06-07 | End: 2019-06-07

## 2019-06-07 RX ADMIN — Medication 10 ML: at 15:26

## 2019-06-07 RX ADMIN — SODIUM CHLORIDE 100 ML: 900 INJECTION, SOLUTION INTRAVENOUS at 15:26

## 2019-06-07 RX ADMIN — IOPAMIDOL 100 ML: 755 INJECTION, SOLUTION INTRAVENOUS at 15:26

## 2020-06-23 ENCOUNTER — TELEPHONE (OUTPATIENT)
Dept: CARDIOLOGY CLINIC | Age: 45
End: 2020-06-23

## 2020-06-23 NOTE — TELEPHONE ENCOUNTER
LVM for patient to return call at earliest convenience. Scheduled first available in clinic appt with Dr. Jaycee Pierce. Future Appointments   Date Time Provider [facility]   7/8/2020  1:20 PM Estela Hdz  [address]   7/9/2020  1:30 PM Bryan Watkins  [facility]     Patient returned call and confirmed appt date/time is okay with her.   2 pt identifiers used

## 2020-07-08 ENCOUNTER — OFFICE VISIT (OUTPATIENT)
Dept: CARDIOLOGY CLINIC | Age: 45
End: 2020-07-08

## 2020-07-08 VITALS
RESPIRATION RATE: 16 BRPM | WEIGHT: 126 LBS | DIASTOLIC BLOOD PRESSURE: 76 MMHG | OXYGEN SATURATION: 98 % | BODY MASS INDEX: 23.19 KG/M2 | HEART RATE: 88 BPM | HEIGHT: 62 IN | SYSTOLIC BLOOD PRESSURE: 140 MMHG

## 2020-07-08 DIAGNOSIS — I10 HYPERTENSION, UNSPECIFIED TYPE: ICD-10-CM

## 2020-07-08 DIAGNOSIS — R00.2 PALPITATIONS: ICD-10-CM

## 2020-07-08 DIAGNOSIS — R00.0 TACHYCARDIA: Primary | ICD-10-CM

## 2020-07-08 RX ORDER — METOPROLOL SUCCINATE 25 MG/1
1 TABLET, EXTENDED RELEASE ORAL DAILY
COMMUNITY
Start: 2020-05-27

## 2020-07-08 NOTE — Clinical Note
You have sent me some of this nicest folks today! Thank you! I think she is fine but will go ahead with a holter monitor to help relieve her anxiety and she will restart her exercise program as well. She wondered about stopping metoprolol- I told her it was not something I would recommend right now but that maybe in 6-12 months you can see how everything is doing and reevaluate that.

## 2020-07-08 NOTE — PROGRESS NOTES
[facility] [place]: Mckenzie Herrera  [phone]  Requesting/referring provider: Dr. Donn Cardoso  Reason for Consult: palpitations, anxiety, tachycardia    HPI: Eliazar Paredes, a 54y.o. year-old who presents for evaluation of HTN, tachycardia. Concern for anxiety, on low dose metoprolol. On zoloft as well, recent start. EKG NST   Last year went to Gyn and for her annual her bp was up. Then 2 weeks later at Dr. Jimmy Arteaga appt her BP was still high and the HR was up. She went to the ER and stayed overnight and the echo was ok. She started metoprolol at 50mg at that time. After that she was fine. She started tracking her BP and was worried that it was high. Also then hen checking her pulse she would notice that it was high. Also sometimes a bit lightheaded with it going fast.   She was wondering if she really needed bp medication and that it wasn't going too low. Very very heavy periods, may be premenopausal.   No anemia a year ago. Was doing cardio classes, etc. One hour 3-4 times a week. Not exercising during all this, has done some running. Walking a lot. Reviewed stress, anxiety, recent decrease in exercise, and palpitations. No high risk features of syncope. Recent normal echo. At this point, need to get a holter to look at HR and ro arrhythmia, reviewed when to call for acceleration/worsening of sx. Also caffeine and stress as contributors    She wondered about stopping metoprolol- I told her it was not something I would recommend right now but that maybe in 6-12 months you can see how everything is doing and reevaluate that. Also very concerned about her BP but when we looked at all her readings, they are averaging around 120/75- not lower than 110 or over 599 systolic- looking great! Assessment/Plan:  1. Tachycardia sinus tach today, check holter for extent and arrhythmia  2. HTN- mild, at goal on minimal meds, and tolerating metoprolol, continue.    3. Anxiety has not started sertraline, she may trial restarting exercise first 55mgpd2d/w  4. Dyslipidemia- diet, exercise, etc.     Echo 4/19 normal  FHx no early cad  Soc rare etoh, no tob 2 c coffee  She  has a past medical history of Gestational diabetes, Hypercholesteremia, and Hypertensive urgency. Cardiovascular ROS: no chest pain or dyspnea on exertion  Respiratory ROS: negative  Neurological ROS: no TIA or stroke symptoms  All other systems negative except as above. PE  Vitals:    07/08/20 1339   BP: 140/76   Pulse: 88   Resp: 16   SpO2: 98%   Weight: 126 lb (57.2 kg)   Height: 5' 2\" (1.575 m)    Body mass index is 23.05 kg/m².    General appearance - alert, well appearing, and in no distress  Mental status - affect appropriate to mood  Eyes - sclera anicteric, moist mucous membranes  Neck - supple, no significant adenopathy  Lymphatics - no  lymphadenopathy  Chest - clear to auscultation, no wheezes, rales or rhonchi  Heart - normal rate, regular rhythm, normal S1, S2, no murmurs, rubs, clicks or gallops  Abdomen - soft, nontender, nondistended, no masses or organomegaly  Back exam - full range of motion, no tenderness  Neurological - cranial nerves II through XII grossly intact, no focal deficit  Musculoskeletal - no muscular tenderness noted, normal strength  Extremities - peripheral pulses normal, no pedal edema  Skin - normal coloration  no rashes    Recent Labs:  Lab Results   Component Value Date/Time    Cholesterol, total 192 07/02/2019 02:27 PM    HDL Cholesterol 69 07/02/2019 02:27 PM    LDL, calculated 109 (H) 07/02/2019 02:27 PM    Triglyceride 68 07/02/2019 02:27 PM     Lab Results   Component Value Date/Time    Creatinine 0.89 09/13/2019 12:58 PM     Lab Results   Component Value Date/Time    BUN 14 09/13/2019 12:58 PM     Lab Results   Component Value Date/Time    Potassium 3.9 09/13/2019 12:58 PM     Lab Results   Component Value Date/Time    Hemoglobin A1c 5.0 07/02/2019 02:27 PM     Lab Results   Component Value Date/Time    HGB (POC) 13.7 07/02/2019 02:33 PM    HGB 15.0 04/12/2019 03:04 PM     Lab Results   Component Value Date/Time    PLATELET 248 87/06/7893 03:04 PM       Reviewed:  Past Medical History:   Diagnosis Date    Gestational diabetes     Hypercholesteremia     Hypertensive urgency      Social History     Tobacco Use   Smoking Status Never Smoker   Smokeless Tobacco Never Used     Social History     Substance and Sexual Activity   Alcohol Use Yes    Alcohol/week: 2.5 standard drinks    Types: 3 Standard drinks or equivalent per week    Frequency: 2-3 times a week    Drinks per session: 1 or 2     No Known Allergies    Current Outpatient Medications   Medication Sig    metoprolol succinate (TOPROL-XL) 25 mg XL tablet Take 1 Tab by mouth daily.  acetaminophen (TYLENOL) 325 mg tablet Take 2 Tabs by mouth every four (4) hours as needed for Pain or Fever.  sertraline (ZOLOFT) 25 mg tablet Take 1 Tab by mouth daily.  metoprolol succinate (TOPROL-XL) 50 mg XL tablet Take 1 Tab by mouth daily.  metoprolol succinate (TOPROL-XL) 50 mg XL tablet Take 1 Tab by mouth daily. (Patient taking differently: Take 25 mg by mouth daily.)     No current facility-administered medications for this visit.         Pablo Apodaca MD  [address] [facility] and [facility] [place]  [address] [facility] [address]  [address]

## 2020-07-21 ENCOUNTER — CLINICAL SUPPORT (OUTPATIENT)
Dept: CARDIOLOGY CLINIC | Age: 45
End: 2020-07-21

## 2020-07-21 DIAGNOSIS — R00.0 TACHYCARDIA: ICD-10-CM

## 2020-07-21 DIAGNOSIS — R00.2 PALPITATIONS: ICD-10-CM

## 2020-07-21 DIAGNOSIS — I10 HYPERTENSION, UNSPECIFIED TYPE: ICD-10-CM

## 2020-08-10 ENCOUNTER — TELEPHONE (OUTPATIENT)
Dept: CARDIOLOGY CLINIC | Age: 45
End: 2020-08-10

## 2020-08-10 NOTE — TELEPHONE ENCOUNTER
[facility] for patient to return call at earliest convenience. The following test results sent through my chart, pt did not read. Your heart monitor did not show any arrhythmias. You were in a normal rhythm the whole time. Your heart rate fluctuated between  but your average heart rate was 79 bpm which is normal.  We did not find anything concerning on this monitor.     To help with palpitations/heart flutters please work on stress reduction, regular exercise, limiting caffeine and alcohol intake and getting 7-8 hours of sleep at night. Please call the office at [phone] if you have any questions.     Maribell See NP       Patient has not returned call but has now read above my chart message.  8/11/20

## 2020-08-11 ENCOUNTER — HOSPITAL ENCOUNTER (OUTPATIENT)
Dept: CT IMAGING | Age: 45
Discharge: HOME | End: 2020-08-11
Attending: INTERNAL MEDICINE
Payer: COMMERCIAL

## 2020-08-11 DIAGNOSIS — R91.1 PULMONARY NODULE: ICD-10-CM

## 2020-08-11 PROCEDURE — 74011636320 HC RX REV CODE- 636/320: Performed by: INTERNAL MEDICINE

## 2020-08-11 PROCEDURE — 71260 CT THORAX DX C+: CPT

## 2020-08-11 PROCEDURE — 74011000258 HC RX REV CODE- 258: Performed by: INTERNAL MEDICINE

## 2020-08-11 RX ORDER — SODIUM CHLORIDE 0.9 % (FLUSH) 0.9 %
10 SYRINGE (ML) INJECTION
Status: COMPLETED | OUTPATIENT
Start: 2020-08-11 | End: 2020-08-11

## 2020-08-11 RX ADMIN — IOPAMIDOL 100 ML: 612 INJECTION, SOLUTION INTRAVENOUS at 10:33

## 2020-08-11 RX ADMIN — Medication 10 ML: at 10:33

## 2020-08-11 RX ADMIN — SODIUM CHLORIDE 100 ML: 900 INJECTION, SOLUTION INTRAVENOUS at 10:33

## 2020-08-11 NOTE — PROGRESS NOTES
pls call- pulmonary nodule stable. No need for further ct's due to small size unless you develop symptoms of cough, sob or chest pain.